# Patient Record
(demographics unavailable — no encounter records)

---

## 2025-07-08 NOTE — HISTORY OF PRESENT ILLNESS
[FreeTextEntry1] : Michelle is a 61yoF PMH OA, pre-DM, cervical radiculopathy who presents for an abnormal EKG  She presents today as a referral from her PCP for an abnormal EKG   Labs 6/27/25:   HDL 52  LDL c 131 a1c 5.9%  States she eats a lot of red meat Does not exercise per se but has no symptoms with chair yoga or with going up and down her stairs

## 2025-07-08 NOTE — DISCUSSION/SUMMARY
[FreeTextEntry1] : Michelle is a 61yoF PMH OA, pre-DM, cervical radiculopathy who presents for an abnormal EKG  She presents today as a referral from Dr. Velez for an abnormal EKG. She had an EKG done on 6/26/25 which showed TWI II, biphasic in V3-V4. Today her EKG is overall unchanged and shows TWI III, V2-V3. She states that her PCP has informed her that her EKG from 2024 did not appear to show this. She states she has absolutely no symptoms at this time though she does not exercise too much.  We will proceed with a TTE at this time to ensure no structural abnormalities. I have also requested a copy of all her previous EKGs for comparison. Will hold off on stress testing for now given she is asymptomatic. I have encouraged her to exercise more. Strict ED precautions given as well for any new onset chest pain or SOB.   Her HLD is noted. Diet and exercise discussed at length. We discussed proceeding with a CT calcium score- she will think about this.   To follow up in 4 months.  [EKG obtained to assist in diagnosis and management of assessed problem(s)] : EKG obtained to assist in diagnosis and management of assessed problem(s)